# Patient Record
Sex: FEMALE | Race: WHITE | NOT HISPANIC OR LATINO | URBAN - METROPOLITAN AREA
[De-identification: names, ages, dates, MRNs, and addresses within clinical notes are randomized per-mention and may not be internally consistent; named-entity substitution may affect disease eponyms.]

---

## 2018-01-08 ENCOUNTER — OFFICE VISIT (OUTPATIENT)
Dept: MEDICAL GROUP | Facility: MEDICAL CENTER | Age: 78
End: 2018-01-08
Payer: MEDICARE

## 2018-01-08 VITALS
HEART RATE: 74 BPM | TEMPERATURE: 98.2 F | DIASTOLIC BLOOD PRESSURE: 74 MMHG | WEIGHT: 140.2 LBS | RESPIRATION RATE: 16 BRPM | SYSTOLIC BLOOD PRESSURE: 118 MMHG | HEIGHT: 62 IN | OXYGEN SATURATION: 92 % | BODY MASS INDEX: 25.8 KG/M2

## 2018-01-08 DIAGNOSIS — Z72.0 TOBACCO USE: ICD-10-CM

## 2018-01-08 DIAGNOSIS — E03.9 ACQUIRED HYPOTHYROIDISM: ICD-10-CM

## 2018-01-08 DIAGNOSIS — J45.21 MILD INTERMITTENT ASTHMA WITH EXACERBATION: ICD-10-CM

## 2018-01-08 DIAGNOSIS — Z12.31 ENCOUNTER FOR SCREENING MAMMOGRAM FOR MALIGNANT NEOPLASM OF BREAST: ICD-10-CM

## 2018-01-08 PROCEDURE — 99204 OFFICE O/P NEW MOD 45 MIN: CPT | Performed by: INTERNAL MEDICINE

## 2018-01-08 RX ORDER — FEXOFENADINE HCL 180 MG/1
180 TABLET ORAL DAILY
Qty: 90 TAB | Refills: 3 | Status: SHIPPED | OUTPATIENT
Start: 2018-01-08

## 2018-01-08 RX ORDER — LORATADINE 10 MG/1
10 TABLET ORAL DAILY
COMMUNITY

## 2018-01-08 RX ORDER — TRIAMCINOLONE ACETONIDE 40 MG/ML
40 INJECTION, SUSPENSION INTRA-ARTICULAR; INTRAMUSCULAR ONCE
Status: COMPLETED | OUTPATIENT
Start: 2018-01-08 | End: 2018-01-08

## 2018-01-08 RX ORDER — MONTELUKAST SODIUM 10 MG/1
10 TABLET ORAL DAILY
Qty: 90 TAB | Refills: 0 | Status: SHIPPED | OUTPATIENT
Start: 2018-01-08

## 2018-01-08 RX ORDER — FLUTICASONE PROPIONATE 50 MCG
1 SPRAY, SUSPENSION (ML) NASAL DAILY
COMMUNITY

## 2018-01-08 RX ORDER — AZITHROMYCIN 250 MG/1
TABLET, FILM COATED ORAL
Qty: 6 TAB | Refills: 0 | Status: SHIPPED | OUTPATIENT
Start: 2018-01-08 | End: 2018-01-15

## 2018-01-08 RX ORDER — LEVOTHYROXINE SODIUM 0.07 MG/1
75 TABLET ORAL
COMMUNITY
End: 2018-01-15 | Stop reason: SDUPTHER

## 2018-01-08 RX ORDER — PREDNISONE 10 MG/1
TABLET ORAL
Qty: 15 TAB | Refills: 0 | Status: CANCELLED | OUTPATIENT
Start: 2018-01-08 | End: 2018-01-14

## 2018-01-08 RX ADMIN — TRIAMCINOLONE ACETONIDE 40 MG: 40 INJECTION, SUSPENSION INTRA-ARTICULAR; INTRAMUSCULAR at 18:02

## 2018-01-08 ASSESSMENT — PATIENT HEALTH QUESTIONNAIRE - PHQ9: CLINICAL INTERPRETATION OF PHQ2 SCORE: 0

## 2018-01-09 NOTE — PROGRESS NOTES
CHIEF COMPLAINT  Chief Complaint   Patient presents with   • Establish Care     thyroid issues, chronic back pain and allergies     HPI  Patient is a 77 y.o. female patient who presents today for the following     HYPOTHYROIDISM  Levothyroxine, 75 mcg, taking daily early in am, before any po intake.  No temperature intolerance. No change in weight,  hair/skin quality, BMs.   No tremors, weakness.  No peripheral swelling.  No mood changes.  FH: N    Allergies   Onset: since childhood  Season/timing:  throughout the year   - worsening  Sx: Nasal congestion/sneezing, wheezing.   Medications: Claritin and Flonase daily   - Combivent daily  FH: N    Asthma  Since the age of 71 yrs  Worse for the last few months.  Combivent use: daily   - few times daily in the past week  FH: N    Tobacco use  Amount: 2 cig/day    Reviewed PMH, PSH, FH, SH, ALL, HCM/IMM, no changes  Reviewed MEDS, no changes    Patient Active Problem List    Diagnosis Date Noted   • Acquired hypothyroidism 01/08/2018   • Encounter for screening mammogram for malignant neoplasm of breast 01/08/2018     CURRENT MEDICATIONS  Current Outpatient Prescriptions   Medication Sig Dispense Refill   • levothyroxine (SYNTHROID) 75 MCG Tab Take 75 mcg by mouth Every morning on an empty stomach.     • loratadine (CLARITIN) 10 MG Tab Take 10 mg by mouth every day.     • fluticasone (FLONASE) 50 MCG/ACT nasal spray Spray 1 Spray in nose every day.     • albuterol-ipratropium (COMBIVENT)  MCG/ACT Aerosol Inhale 2 Puffs by mouth 4 times a day.       No current facility-administered medications for this visit.      ALLERGIES  Allergies: Penicillins  PAST MEDICAL HISTORY  Past Medical History:   Diagnosis Date   • Asthma    • Environmental allergies    • Hypothyroidism    • Osteopenia    • Sciatica of right side      SURGICAL HISTORY  She  has a past surgical history that includes primary c section and hysterectomy radical.  SOCIAL HISTORY  Social History   Substance  "Use Topics   • Smoking status: Heavy Tobacco Smoker     Packs/day: 2.00     Types: Cigarettes   • Smokeless tobacco: Never Used   • Alcohol use Yes      Comment: occasionally     Social History     Social History Narrative   • No narrative on file     FAMILY HISTORY  Family History   Problem Relation Age of Onset   • Psychiatry Mother      Dementia   • Cancer Father      Smoker   • Thyroid Neg Hx    • Allergies Neg Hx      Family Status   Relation Status   • Mother    • Father    • Neg Hx      ROS   Constitutional: Negative for fever, chills.  HENT: Negative for sore throat. And per HPI.  Eyes: Negative for blurred vision.   Respiratory: Negative for cough, shortness of breath.  Cardiovascular: Negative for chest pain, palpitations.   Gastrointestinal: Negative for heartburn, nausea, abdominal pain.   Genitourinary: Negative for dysuria.  Musculoskeletal: Negative for significant myalgias, back pain and joint pain.   Skin: Negative for rash and itching.   Neuro: Negative for dizziness, weakness and headaches.   Endo/Heme/Allergies: Does not bruise/bleed easily. And per HPI.  Psychiatric/Behavioral: Negative for depression, anxiety    PHYSICAL EXAM   Blood pressure 118/74, pulse 74, temperature 36.8 °C (98.2 °F), resp. rate 16, height 1.575 m (5' 2\"), weight 63.6 kg (140 lb 3.2 oz), SpO2 92 %. Body mass index is 25.64 kg/m².  General:  NAD, well appearing  HEENT:   NC/AT, PERRLA, EOMI, TMs are clear. Oropharyngeal mucosa is pink,  without lesions;  no cervical / supraclavicular  lymphadenopathy, no thyromegaly.    Cardiovascular: RRR.   No m/r/g. No carotid bruits .      Lungs:   Decreased air entry with bilateral wheezing, no r/r, no respiratory distress.  Abdomen: Soft, NT/ND + BS; no suprapubic tenderness; no masses or hepatosplenomegaly.  Extremities:  2+ DP and radial pulses bilaterally.  No c/c/e.   Skin:  Warm, dry.  No erythema. No rash.   Neurologic: Alert & oriented x 3.No focal " deficits.  Psychiatric:  Affect normal, mood normal, judgment normal.    LABS     Pending    IMAGING     None    ASSESMENT AND PLAN        1. Mild intermittent asthma with exacerbation  - azithromycin (ZITHROMAX) 250 MG Tab; Take 1 tabl twice daily the first day, then 1 tabl daily, PO.  Dispense: 6 Tab; Refill: 0    Advised to take abx after a meal.   Side effects of abx use discussed with a patient. Advised to stop abx and call if develop any side effect, including diarrhea.     - fexofenadine (ALLEGRA) 180 MG tablet; Take 1 Tab by mouth every day.  Dispense: 90 Tab; Refill: 3  - montelukast (SINGULAIR) 10 MG Tab; Take 1 Tab by mouth every day.  Dispense: 90 Tab; Refill: 0  - triamcinolone acetonide (KENALOG-40) injection 40 mg; 1 mL by Intramuscular route Once.    Increase fluid intake  Continue Combivent 3-4 times daily for the next 5 days    2. Acquired hypothyroidism  Asymptomatic.  Continue current dose of levothyroxine, pending TSH  - TSH; Future    3. Encounter for screening mammogram for malignant neoplasm of breast  - MA-SCREEN MAMMO W/CAD-BILAT; Future    4. Tobacco use  Advised to quit smoking    Followup: Return in about 4 weeks (around 2/5/2018) for Short.    All questions are answered.    Please note that this dictation was created using voice recognition software, and that there might be errors of court and possibly content.

## 2018-01-10 ENCOUNTER — TELEPHONE (OUTPATIENT)
Dept: MEDICAL GROUP | Facility: MEDICAL CENTER | Age: 78
End: 2018-01-10

## 2018-01-10 NOTE — TELEPHONE ENCOUNTER
ESTABLISHED PATIENT PRE-VISIT PLANNING     Note: Patient will not be contacted if there is no indication to call.     1.  Reviewed notes from the last few office visits within the medical group: Yes    2.  If any orders were placed at last visit or intended to be done for this visit (i.e. 6 mos follow-up), do we have Results/Consult Notes?        •  Labs - Labs ordered, NOT completed. Patient advised to complete prior to next appointment.   Note: If patient appointment is for lab review and patient did not complete labs, check with provider if OK to reschedule patient until labs completed.       •  Imaging - Imaging ordered, NOT completed. Patient advised to complete prior to next appointment.       •  Referrals - No referrals were ordered at last office visit.    3. Is this appointment scheduled as a Hospital Follow-Up? No    4.  Immunizations were updated in Epic using WebIZ?: No WebIZ record       •  Web Iz Recommendations:     5.  Patient is due for the following Health Maintenance Topics:   Health Maintenance Due   Topic Date Due   • IMM DTaP/Tdap/Td Vaccine (1 - Tdap) 05/08/1960   • MAMMOGRAM  05/08/1981   • IMM ZOSTER VACCINE  SCHEDULED  05/08/2001   • BONE DENSITY  05/08/2006   • IMM PNEUMOCOCCAL 65+ (ADULT) LOW/MEDIUM RISK SERIES (1 of 2 - PCV13) 05/08/2006   • IMM INFLUENZA (1) 09/01/2017           6.  Patient was informed to arrive 15 min prior to their scheduled appointment and bring in their medication bottles.      Left msg for patient.

## 2018-01-11 ENCOUNTER — APPOINTMENT (OUTPATIENT)
Dept: MEDICAL GROUP | Facility: MEDICAL CENTER | Age: 78
End: 2018-01-11
Payer: MEDICARE

## 2018-01-15 ENCOUNTER — OFFICE VISIT (OUTPATIENT)
Dept: MEDICAL GROUP | Facility: MEDICAL CENTER | Age: 78
End: 2018-01-15
Payer: MEDICARE

## 2018-01-15 VITALS
DIASTOLIC BLOOD PRESSURE: 72 MMHG | HEART RATE: 100 BPM | RESPIRATION RATE: 20 BRPM | BODY MASS INDEX: 24.67 KG/M2 | WEIGHT: 134.04 LBS | TEMPERATURE: 98.7 F | SYSTOLIC BLOOD PRESSURE: 140 MMHG | OXYGEN SATURATION: 93 % | HEIGHT: 62 IN

## 2018-01-15 DIAGNOSIS — E03.9 ACQUIRED HYPOTHYROIDISM: ICD-10-CM

## 2018-01-15 DIAGNOSIS — Z72.0 TOBACCO USE: ICD-10-CM

## 2018-01-15 DIAGNOSIS — Z00.00 HEALTH CARE MAINTENANCE: ICD-10-CM

## 2018-01-15 DIAGNOSIS — J45.21 MILD INTERMITTENT ASTHMA WITH EXACERBATION: ICD-10-CM

## 2018-01-15 PROCEDURE — 99214 OFFICE O/P EST MOD 30 MIN: CPT | Performed by: INTERNAL MEDICINE

## 2018-01-15 RX ORDER — LEVOTHYROXINE SODIUM 0.07 MG/1
75 TABLET ORAL
Qty: 90 TAB | Refills: 0 | Status: SHIPPED | OUTPATIENT
Start: 2018-01-15

## 2018-01-16 NOTE — PROGRESS NOTES
CHIEF COMPLAINT  Chief Complaint   Patient presents with   • Follow-Up     Asthma     HPI  Patient is a 77 y.o. female patient who presents today for the following     Asthma  Interval course:   - started singular at last office visit  - Improved, got worse yesterday with wheezing, with the weather change    Background:  Since the age of 71 yrs  Worse for the last few months.  Combivent use: daily                        - few times daily in the past week  FH: N    Allergies   Onset: since childhood  Season/timing:  throughout the year                        - worsening  Sx: Nasal congestion/sneezing, wheezing.   Medications: Claritin and Flonase daily                        - Combivent daily  FH: N     Tobacco use  Amount: 2 cig/day, no change.    HYPOTHYROIDISM  Levothyroxine, 75 mcg, taking daily early in am, before any po intake.  No temperature intolerance. No change in weight,  hair/skin quality, BMs.   No tremors, weakness.  No peripheral swelling.  No mood changes.  FH: N  TSH: pending    Reviewed PMH, PSH, FH, SH, ALL, HCM/IMM, no changes  Reviewed MEDS, no changes    Patient Active Problem List    Diagnosis Date Noted   • Acquired hypothyroidism 01/08/2018   • Encounter for screening mammogram for malignant neoplasm of breast 01/08/2018   • Tobacco use 01/08/2018   • Mild intermittent asthma with exacerbation 01/08/2018     CURRENT MEDICATIONS  Current Outpatient Prescriptions   Medication Sig Dispense Refill   • levothyroxine (SYNTHROID) 75 MCG Tab Take 1 Tab by mouth Every morning on an empty stomach. 90 Tab 0   • albuterol-ipratropium (COMBIVENT)  MCG/ACT Aerosol Inhale 2 Puffs by mouth 4 times a day.     • fexofenadine (ALLEGRA) 180 MG tablet Take 1 Tab by mouth every day. 90 Tab 3   • montelukast (SINGULAIR) 10 MG Tab Take 1 Tab by mouth every day. 90 Tab 0   • loratadine (CLARITIN) 10 MG Tab Take 10 mg by mouth every day.     • fluticasone (FLONASE) 50 MCG/ACT nasal spray Spray 1 Spray in nose  "every day.       No current facility-administered medications for this visit.      ALLERGIES  Allergies: Penicillins  PAST MEDICAL HISTORY  Past Medical History:   Diagnosis Date   • Asthma    • Environmental allergies    • Hypothyroidism    • Osteopenia    • Sciatica of right side      SURGICAL HISTORY  She  has a past surgical history that includes primary c section and hysterectomy radical.  SOCIAL HISTORY  Social History   Substance Use Topics   • Smoking status: Current Every Day Smoker     Packs/day: 0.20     Types: Cigarettes   • Smokeless tobacco: Never Used   • Alcohol use Yes      Comment: occasionally     Social History     Social History Narrative   • No narrative on file     FAMILY HISTORY  Family History   Problem Relation Age of Onset   • Psychiatry Mother      Dementia   • Cancer Father      Smoker   • Thyroid Neg Hx    • Allergies Neg Hx      Family Status   Relation Status   • Mother    • Father    • Neg Hx        ROS   Constitutional: Negative for fever, chills.  HENT: Negative for congestion, sore throat.  Eyes: Negative for blurred vision.   Respiratory: as above.  Cardiovascular: Negative for chest pain, palpitations.   Gastrointestinal: Negative for heartburn, nausea, abdominal pain.   Genitourinary: Negative for dysuria.  Musculoskeletal: Negative for significant myalgias, back pain and joint pain.   Skin: Negative for rash and itching.   Neuro: Negative for dizziness, weakness and headaches.   Endo/Heme/Allergies: Does not bruise/bleed easily. And per HPI.  Psychiatric/Behavioral: Negative for depression, anxiety    PHYSICAL EXAM   Blood pressure 140/72, pulse 100, temperature 37.1 °C (98.7 °F), resp. rate 20, height 1.575 m (5' 2\"), weight 60.8 kg (134 lb 0.6 oz), SpO2 93 %. Body mass index is 24.52 kg/m².  General:  NAD, well appearing  HEENT:   NC/AT, PERRLA, EOMI, TMs are clear. Oropharyngeal mucosa is pink,  without lesions;  no cervical / supraclavicular  " lymphadenopathy, no thyromegaly.    Cardiovascular: RRR.   No m/r/g. No carotid bruits .      Lungs:   Mild B/L expiratory wheezing, no r/r, no respiratory distress.  Abdomen: Soft, NT/ND + BS; no suprapubic tenderness; no masses or hepatosplenomegaly.  Extremities:  2+ DP and radial pulses bilaterally.  No c/c/e.   Skin:  Warm, dry.  No erythema. No rash.   Neurologic: Alert & oriented x 3. No focal deficits.  Psychiatric:  Affect normal, mood normal, judgment normal.    LABS     Pending.    IMAGING     None    ASSESMENT AND PLAN        1. Mild intermittent asthma with exacerbation  Continue current treatment, follow-up in 4-6 weeks.   Anticipate to start inhaled steroid if no response to Singulair.    2. Acquired hypothyroidism  Pending TSH, continue current dose of levothyroxine  - levothyroxine (SYNTHROID) 75 MCG Tab; Take 1 Tab by mouth Every morning on an empty stomach.  Dispense: 90 Tab; Refill: 0    3. Tobacco use  Advised to quit smoking    4. Health care maintenance  Pending records.   Pending mammogram within one week.    Counseling:   - Smoking:  Nonsmoker    Followup: Return in about 4 weeks (around 2/12/2018) for Short.    All questions are answered.    Please note that this dictation was created using voice recognition software, and that there might be errors of court and possibly content.

## 2018-01-18 ENCOUNTER — HOSPITAL ENCOUNTER (OUTPATIENT)
Dept: LAB | Facility: MEDICAL CENTER | Age: 78
End: 2018-01-18
Attending: INTERNAL MEDICINE
Payer: MEDICARE

## 2018-01-18 DIAGNOSIS — E03.9 ACQUIRED HYPOTHYROIDISM: ICD-10-CM

## 2018-01-18 LAB — TSH SERPL DL<=0.005 MIU/L-ACNC: 2.07 UIU/ML (ref 0.38–5.33)

## 2018-01-18 PROCEDURE — 36415 COLL VENOUS BLD VENIPUNCTURE: CPT

## 2018-01-18 PROCEDURE — 84443 ASSAY THYROID STIM HORMONE: CPT

## 2018-01-19 ENCOUNTER — APPOINTMENT (OUTPATIENT)
Dept: RADIOLOGY | Facility: MEDICAL CENTER | Age: 78
End: 2018-01-19
Attending: INTERNAL MEDICINE
Payer: MEDICARE

## 2018-01-22 ENCOUNTER — TELEPHONE (OUTPATIENT)
Dept: MEDICAL GROUP | Facility: MEDICAL CENTER | Age: 78
End: 2018-01-22

## 2018-02-08 ENCOUNTER — TELEPHONE (OUTPATIENT)
Dept: MEDICAL GROUP | Facility: MEDICAL CENTER | Age: 78
End: 2018-02-08

## 2018-02-08 NOTE — TELEPHONE ENCOUNTER
ESTABLISHED PATIENT PRE-VISIT PLANNING     Note: Patient will not be contacted if there is no indication to call.     1.  Reviewed notes from the last few office visits within the medical group: Yes 01/22/2018    2.  If any orders were placed at last visit or intended to be done for this visit (i.e. 6 mos follow-up), do we have Results/Consult Notes?        •  Labs - Labs ordered, completed on 01/18/2018 and results are in chart.   Note: If patient appointment is for lab review and patient did not complete labs, check with provider if OK to reschedule patient until labs completed.       •  Imaging MAMMO ORDERED      3. Is this appointment scheduled as a Hospital Follow-Up? No    4.  Immunizations were updated in Epic using WebIZ?: No WebIZ record      5.  Patient is due for the following Health Maintenance Topics:   Health Maintenance Due   Topic Date Due   • IMM DTaP/Tdap/Td Vaccine (1 - Tdap) 05/08/1960   • MAMMOGRAM  05/08/1981   • IMM ZOSTER VACCINE  05/08/2001   • BONE DENSITY  05/08/2006   • IMM PNEUMOCOCCAL 65+ (ADULT) LOW/MEDIUM RISK SERIES (1 of 2 - PCV13) 05/08/2006   • IMM INFLUENZA (1) 09/01/2017         6.  Patient was NOT informed to arrive 15 min prior to their scheduled appointment and bring in their medication bottles.

## 2018-02-12 ENCOUNTER — APPOINTMENT (OUTPATIENT)
Dept: MEDICAL GROUP | Facility: MEDICAL CENTER | Age: 78
End: 2018-02-12
Payer: MEDICARE

## 2018-03-05 ENCOUNTER — OFFICE VISIT (OUTPATIENT)
Dept: MEDICAL GROUP | Facility: MEDICAL CENTER | Age: 78
End: 2018-03-05
Payer: MEDICARE

## 2018-03-05 VITALS
HEIGHT: 62 IN | TEMPERATURE: 97.2 F | WEIGHT: 139.11 LBS | BODY MASS INDEX: 25.6 KG/M2 | OXYGEN SATURATION: 94 % | SYSTOLIC BLOOD PRESSURE: 142 MMHG | HEART RATE: 74 BPM | DIASTOLIC BLOOD PRESSURE: 82 MMHG

## 2018-03-05 DIAGNOSIS — Z00.00 HEALTH CARE MAINTENANCE: ICD-10-CM

## 2018-03-05 DIAGNOSIS — J45.21 MILD INTERMITTENT ASTHMA WITH EXACERBATION: ICD-10-CM

## 2018-03-05 DIAGNOSIS — J30.1 CHRONIC SEASONAL ALLERGIC RHINITIS DUE TO POLLEN: ICD-10-CM

## 2018-03-05 DIAGNOSIS — Z72.0 TOBACCO USE: ICD-10-CM

## 2018-03-05 PROCEDURE — 99214 OFFICE O/P EST MOD 30 MIN: CPT | Performed by: INTERNAL MEDICINE

## 2018-03-05 RX ORDER — FLUTICASONE PROPIONATE 220 UG/1
1 AEROSOL, METERED RESPIRATORY (INHALATION) 2 TIMES DAILY
Qty: 1 INHALER | Refills: 2 | Status: SHIPPED | OUTPATIENT
Start: 2018-03-05

## 2018-03-05 RX ORDER — ALBUTEROL SULFATE 90 UG/1
2 AEROSOL, METERED RESPIRATORY (INHALATION) EVERY 6 HOURS PRN
Qty: 8.5 G | Refills: 3 | Status: SHIPPED | OUTPATIENT
Start: 2018-03-05

## 2018-03-06 NOTE — PROGRESS NOTES
CHIEF COMPLAINT  Chief Complaint   Patient presents with   • Asthma     Asthma 2 mon FV     HPI  Patient is a 77 y.o. female patient who presents today for the following       Asthma  Interval course:   - started singular at last office visit  - Continued stool have almost daily wheezing and cough  - Was compliant with medications  - Has not used Combivent daily     Background:  Since the age of 71 yrs  Worse for the last few months.  Combivent use: daily                        - few times daily in the past week  FH: N     Allergies   Onset: since childhood  Season/timing:  throughout the year                        - worsening  Sx: Nasal congestion/sneezing, wheezing.   Medications: Claritin and Flonase daily  She has not have allergy evaluation in the past.  FH: N    Tobacco use  Amount: 2 cig/day, no change.    Reviewed PMH, PSH, FH, SH, ALL, HCM/IMM, no changes  Reviewed MEDS, no changes    Patient Active Problem List    Diagnosis Date Noted   • Acquired hypothyroidism 01/08/2018   • Encounter for screening mammogram for malignant neoplasm of breast 01/08/2018   • Tobacco use 01/08/2018   • Mild intermittent asthma with exacerbation 01/08/2018     CURRENT MEDICATIONS  Current Outpatient Prescriptions   Medication Sig Dispense Refill   • fluticasone (FLOVENT HFA) 220 MCG/ACT Aerosol Inhale 1 Puff by mouth 2 times a day. 1 Inhaler 2   • albuterol 108 (90 Base) MCG/ACT Aero Soln inhalation aerosol Inhale 2 Puffs by mouth every 6 hours as needed for Shortness of Breath. 8.5 g 3   • levothyroxine (SYNTHROID) 75 MCG Tab Take 1 Tab by mouth Every morning on an empty stomach. 90 Tab 0   • albuterol-ipratropium (COMBIVENT)  MCG/ACT Aerosol Inhale 2 Puffs by mouth 4 times a day.     • fexofenadine (ALLEGRA) 180 MG tablet Take 1 Tab by mouth every day. 90 Tab 3   • montelukast (SINGULAIR) 10 MG Tab Take 1 Tab by mouth every day. 90 Tab 0   • loratadine (CLARITIN) 10 MG Tab Take 10 mg by mouth every day.     •  "fluticasone (FLONASE) 50 MCG/ACT nasal spray Spray 1 Spray in nose every day.       No current facility-administered medications for this visit.      ALLERGIES  Allergies: Penicillins  PAST MEDICAL HISTORY  Past Medical History:   Diagnosis Date   • Asthma    • Environmental allergies    • Hypothyroidism    • Osteopenia    • Sciatica of right side      SURGICAL HISTORY  She  has a past surgical history that includes primary c section and hysterectomy radical.  SOCIAL HISTORY  Social History   Substance Use Topics   • Smoking status: Current Every Day Smoker     Packs/day: 0.20     Types: Cigarettes   • Smokeless tobacco: Never Used   • Alcohol use Yes      Comment: occasionally     Social History     Social History Narrative   • No narrative on file     FAMILY HISTORY  Family History   Problem Relation Age of Onset   • Psychiatry Mother      Dementia   • Cancer Father      Smoker   • Thyroid Neg Hx    • Allergies Neg Hx      Family Status   Relation Status   • Mother    • Father    • Neg Hx      ROS   Constitutional: Negative for fever, chills.  HENT: Negative for congestion, sore throat.  Eyes: Negative for blurred vision.   Respiratory:  As above  Cardiovascular: Negative for chest pain, palpitations.   Gastrointestinal: Negative for heartburn, nausea, abdominal pain.   Musculoskeletal: Negative for significant myalgias, back pain and joint pain.   Skin: Negative for rash and itching.   Neuro: Negative for dizziness, weakness and headaches.   Endo/Heme/Allergies: Does not bruise/bleed easily.   Psychiatric/Behavioral: Negative for depression, anxiety    PHYSICAL EXAM   Blood pressure 142/82, pulse 74, temperature 36.2 °C (97.2 °F), height 1.575 m (5' 2\"), weight 63.1 kg (139 lb 1.8 oz), SpO2 94 %. Body mass index is 25.44 kg/m².  General:  NAD, well appearing  HEENT:   NC/AT, PERRLA, EOMI, TMs are clear. Oropharyngeal mucosa is pink,  without lesions;  no cervical / supraclavicular  " lymphadenopathy, no thyromegaly.    Cardiovascular: RRR.   No m/r/g. No carotid bruits .      Lungs:   Bilateral wheezing, good air entry, no r/r, no respiratory distress.  Abdomen: Soft, NT/ND + BS; no suprapubic tenderness; no masses or hepatosplenomegaly.  Extremities:  2+ DP and radial pulses bilaterally.  No c/c/e.   Skin:  Warm, dry.  No erythema. No rash.   Neurologic: Alert & oriented x 3. No focal deficits.  Psychiatric:  Affect normal, mood normal, judgment normal.    LABS     None    IMAGING     None    ASSESMENT AND PLAN        1. Mild intermittent asthma with exacerbation  Uncontrolled, add Flovent and albuterol instead of Combivent:    - fluticasone (FLOVENT HFA) 220 MCG/ACT Aerosol; Inhale 1 Puff by mouth 2 times a day.  Dispense: 1 Inhaler; Refill: 2  - PULMONARY FUNCTION TESTS Test requested: Spirometry with-out & with Bronchodilator  - albuterol 108 (90 Base) MCG/ACT Aero Soln inhalation aerosol; Inhale 2 Puffs by mouth every 6 hours as needed for Shortness of Breath.  Dispense: 8.5 g; Refill: 3    2. Chronic seasonal allergic rhinitis due to pollen  - REFERRAL TO ALLERGY    3. Tobacco use  Advised to quit smoking    4. Health care maintenance  Advised to do mammogram, already given orders.   Advised immunizations per HCM    Counseling:   - Smoking:  Nonsmoker    Followup: Return in about 6 weeks (around 4/16/2018) for Short.    All questions are answered.    Please note that this dictation was created using voice recognition software, and that there might be errors of court and possibly content.

## 2018-03-07 ENCOUNTER — TELEPHONE (OUTPATIENT)
Dept: MEDICAL GROUP | Facility: MEDICAL CENTER | Age: 78
End: 2018-03-07

## 2018-03-07 DIAGNOSIS — J45.21 MILD INTERMITTENT ASTHMA WITH EXACERBATION: ICD-10-CM

## 2018-03-07 RX ORDER — LEVALBUTEROL TARTRATE 45 UG/1
1-2 AEROSOL, METERED ORAL EVERY 6 HOURS PRN
Qty: 1 INHALER | Refills: 3 | Status: SHIPPED | OUTPATIENT
Start: 2018-03-07

## 2018-03-07 NOTE — TELEPHONE ENCOUNTER
Dr. Wright,     Pt called and stated that the albuterol inhaler is causing her more difficulty breathing and causing her not to sleep at night. Should she continue or stop medication? Please advise,     Thank you.    Anjali Mcmillan  Medical Assistant

## 2018-06-21 ENCOUNTER — APPOINTMENT (RX ONLY)
Dept: URBAN - METROPOLITAN AREA CLINIC 31 | Facility: CLINIC | Age: 78
Setting detail: DERMATOLOGY
End: 2018-06-21

## 2018-06-21 DIAGNOSIS — D49.2 NEOPLASM OF UNSPECIFIED BEHAVIOR OF BONE, SOFT TISSUE, AND SKIN: ICD-10-CM

## 2018-06-21 DIAGNOSIS — L259 CONTACT DERMATITIS AND OTHER ECZEMA, UNSPECIFIED CAUSE: ICD-10-CM

## 2018-06-21 PROBLEM — E03.9 HYPOTHYROIDISM, UNSPECIFIED: Status: ACTIVE | Noted: 2018-06-21

## 2018-06-21 PROBLEM — L23.9 ALLERGIC CONTACT DERMATITIS, UNSPECIFIED CAUSE: Status: ACTIVE | Noted: 2018-06-21

## 2018-06-21 PROBLEM — J45.909 UNSPECIFIED ASTHMA, UNCOMPLICATED: Status: ACTIVE | Noted: 2018-06-21

## 2018-06-21 PROCEDURE — ? MEDICATION COUNSELING

## 2018-06-21 PROCEDURE — ? BIOPSY BY SHAVE METHOD

## 2018-06-21 PROCEDURE — ? ADDITIONAL NOTES

## 2018-06-21 PROCEDURE — ? PRESCRIPTION

## 2018-06-21 PROCEDURE — 11100: CPT

## 2018-06-21 PROCEDURE — 99202 OFFICE O/P NEW SF 15 MIN: CPT | Mod: 25

## 2018-06-21 PROCEDURE — ? COUNSELING

## 2018-06-21 RX ORDER — BETAMETHASONE DIPROPIONATE 0.5 MG/G
OINTMENT TOPICAL
Qty: 1 | Refills: 0 | Status: ERX | COMMUNITY
Start: 2018-06-21

## 2018-06-21 RX ADMIN — BETAMETHASONE DIPROPIONATE: 0.5 OINTMENT TOPICAL at 20:26

## 2018-06-21 ASSESSMENT — LOCATION SIMPLE DESCRIPTION DERM
LOCATION SIMPLE: LEFT FOREARM
LOCATION SIMPLE: POSTERIOR NECK
LOCATION SIMPLE: CHEST
LOCATION SIMPLE: CHIN
LOCATION SIMPLE: RIGHT FOREARM

## 2018-06-21 ASSESSMENT — LOCATION ZONE DERM
LOCATION ZONE: TRUNK
LOCATION ZONE: NECK
LOCATION ZONE: ARM
LOCATION ZONE: FACE

## 2018-06-21 ASSESSMENT — LOCATION DETAILED DESCRIPTION DERM
LOCATION DETAILED: MID POSTERIOR NECK
LOCATION DETAILED: LEFT CHIN
LOCATION DETAILED: RIGHT PROXIMAL DORSAL FOREARM
LOCATION DETAILED: LEFT PROXIMAL DORSAL FOREARM
LOCATION DETAILED: UPPER STERNUM

## 2018-06-21 NOTE — PROCEDURE: ADDITIONAL NOTES
Additional Notes: Will do a biopsy today. Will send Rx for Betamethasone Ointment today. Discussed treatment and risks in detail with patient. Recommend gentle cleansers and moisturizer daily, like Cetaphil or CeraVe. Moisturize within 3 minutes of showering.\\nAdvised patient to try to stay out of the sun or wear sun protection.\\nRash is in exposed areas so consider PMLE also; On only thyroid medication and no oral supplements.

## 2018-06-21 NOTE — HPI: RASH
How Severe Is Your Rash?: mild
Is This A New Presentation, Or A Follow-Up?: Rash
Additional History: Patient states the rash started on her chest then spread to her arms. Came up first as red bumps, then blistered. No blisters now.\\nPatient has no history of rashes or Eczema. Patient does have a history of Asthma and seasonal allergies. Patient’s brother has a history of Eczema.

## 2018-06-21 NOTE — PROCEDURE: MEDICATION COUNSELING
Elidel Pregnancy And Lactation Text: This medication is Pregnancy Category C. It is unknown if this medication is excreted in breast milk.
Cephalexin Pregnancy And Lactation Text: This medication is Pregnancy Category B and considered safe during pregnancy.  It is also excreted in breast milk but can be used safely for shorter doses.
Minoxidil Pregnancy And Lactation Text: This medication has not been assigned a Pregnancy Risk Category but animal studies failed to show danger with the topical medication. It is unknown if the medication is excreted in breast milk.
Benzoyl Peroxide Counseling: Patient counseled that medicine may cause skin irritation and bleach clothing.  In the event of skin irritation, the patient was advised to reduce the amount of the drug applied or use it less frequently.   The patient verbalized understanding of the proper use and possible adverse effects of benzoyl peroxide.  All of the patient's questions and concerns were addressed.
Quinolones Counseling:  I discussed with the patient the risks of fluoroquinolones including but not limited to GI upset, allergic reaction, drug rash, diarrhea, dizziness, photosensitivity, yeast infections, liver function test abnormalities, tendonitis/tendon rupture.
Quinolones Pregnancy And Lactation Text: This medication is Pregnancy Category C and it isn't know if it is safe during pregnancy. It is also excreted in breast milk.
Ketoconazole Counseling:   Patient counseled regarding improving absorption with orange juice.  Adverse effects include but are not limited to breast enlargement, headache, diarrhea, nausea, upset stomach, liver function test abnormalities, taste disturbance, and stomach pain.  There is a rare possibility of liver failure that can occur when taking ketoconazole. The patient understands that monitoring of LFTs may be required, especially at baseline. The patient verbalized understanding of the proper use and possible adverse effects of ketoconazole.  All of the patient's questions and concerns were addressed.
Xolair Pregnancy And Lactation Text: This medication is Pregnancy Category B and is considered safe during pregnancy. This medication is excreted in breast milk.
Griseofulvin Counseling:  I discussed with the patient the risks of griseofulvin including but not limited to photosensitivity, cytopenia, liver damage, nausea/vomiting and severe allergy.  The patient understands that this medication is best absorbed when taken with a fatty meal (e.g., ice cream or french fries).
Dupixent Pregnancy And Lactation Text: This medication likely crosses the placenta but the risk for the fetus is uncertain. This medication is excreted in breast milk.
Doxepin Pregnancy And Lactation Text: This medication is Pregnancy Category C and it isn't known if it is safe during pregnancy. It is also excreted in breast milk and breast feeding isn't recommended.
Thalidomide Counseling: I discussed with the patient the risks of thalidomide including but not limited to birth defects, anxiety, weakness, chest pain, dizziness, cough and severe allergy.
Rituxan Counseling:  I discussed with the patient the risks of Rituxan infusions. Side effects can include infusion reactions, severe drug rashes including mucocutaneous reactions, reactivation of latent hepatitis and other infections and rarely progressive multifocal leukoencephalopathy.  All of the patient's questions and concerns were addressed.
SSKI Counseling:  I discussed with the patient the risks of SSKI including but not limited to thyroid abnormalities, metallic taste, GI upset, fever, headache, acne, arthralgias, paraesthesias, lymphadenopathy, easy bleeding, arrhythmias, and allergic reaction.
Stelara Counseling:  I discussed with the patient the risks of ustekinumab including but not limited to immunosuppression, malignancy, posterior leukoencephalopathy syndrome, and serious infections.  The patient understands that monitoring is required including a PPD at baseline and must alert us or the primary physician if symptoms of infection or other concerning signs are noted.
Tazorac Counseling:  Patient advised that medication is irritating and drying.  Patient may need to apply sparingly and wash off after an hour before eventually leaving it on overnight.  The patient verbalized understanding of the proper use and possible adverse effects of tazorac.  All of the patient's questions and concerns were addressed.
Oxybutynin Counseling:  I discussed with the patient the risks of oxybutynin including but not limited to skin rash, drowsiness, dry mouth, difficulty urinating, and blurred vision.
Griseofulvin Pregnancy And Lactation Text: This medication is Pregnancy Category X and is known to cause serious birth defects. It is unknown if this medication is excreted in breast milk but breast feeding should be avoided.
Dapsone Counseling: I discussed with the patient the risks of dapsone including but not limited to hemolytic anemia, agranulocytosis, rashes, methemoglobinemia, kidney failure, peripheral neuropathy, headaches, GI upset, and liver toxicity.  Patients who start dapsone require monitoring including baseline LFTs and weekly CBCs for the first month, then every month thereafter.  The patient verbalized understanding of the proper use and possible adverse effects of dapsone.  All of the patient's questions and concerns were addressed.
Spironolactone Counseling: Patient advised regarding risks of diarrhea, abdominal pain, hyperkalemia, birth defects (for female patients), liver toxicity and renal toxicity. The patient may need blood work to monitor liver and kidney function and potassium levels while on therapy. The patient verbalized understanding of the proper use and possible adverse effects of spironolactone.  All of the patient's questions and concerns were addressed.
Albendazole Pregnancy And Lactation Text: This medication is Pregnancy Category C and it isn't known if it is safe during pregnancy. It is also excreted in breast milk.
Birth Control Pills Pregnancy And Lactation Text: This medication should be avoided if pregnant and for the first 30 days post-partum.
Erivedge Counseling- I discussed with the patient the risks of Erivedge including but not limited to nausea, vomiting, diarrhea, constipation, weight loss, changes in the sense of taste, decreased appetite, muscle spasms, and hair loss.  The patient verbalized understanding of the proper use and possible adverse effects of Erivedge.  All of the patient's questions and concerns were addressed.
Zyclara Counseling:  I discussed with the patient the risks of imiquimod including but not limited to erythema, scaling, itching, weeping, crusting, and pain.  Patient understands that the inflammatory response to imiquimod is variable from person to person and was educated regarded proper titration schedule.  If flu-like symptoms develop, patient knows to discontinue the medication and contact us.
Colchicine Counseling:  Patient counseled regarding adverse effects including but not limited to stomach upset (nausea, vomiting, stomach pain, or diarrhea).  Patient instructed to limit alcohol consumption while taking this medication.  Colchicine may reduce blood counts especially with prolonged use.  The patient understands that monitoring of kidney function and blood counts may be required, especially at baseline. The patient verbalized understanding of the proper use and possible adverse effects of colchicine.  All of the patient's questions and concerns were addressed.
Odomzo Pregnancy And Lactation Text: This medication is Pregnancy Category X and is absolutely contraindicated during pregnancy. It is unknown if it is excreted in breast milk.
Nsaids Pregnancy And Lactation Text: These medications are considered safe up to 30 weeks gestation. It is excreted in breast milk.
Arava Counseling:  Patient counseled regarding adverse effects of Arava including but not limited to nausea, vomiting, abnormalities in liver function tests. Patients may develop mouth sores, rash, diarrhea, and abnormalities in blood counts. The patient understands that monitoring is required including LFTs and blood counts.  There is a rare possibility of scarring of the liver and lung problems that can occur when taking methotrexate. Persistent nausea, loss of appetite, pale stools, dark urine, cough, and shortness of breath should be reported immediately. Patient advised to discontinue Arava treatment and consult with a physician prior to attempting conception. The patient will have to undergo a treatment to eliminate Arava from the body prior to conception.
Enbrel Pregnancy And Lactation Text: This medication is Pregnancy Category B and is considered safe during pregnancy. It is unknown if this medication is excreted in breast milk.
Taltz Counseling: I discussed with the patient the risks of ixekizumab including but not limited to immunosuppression, serious infections, worsening of inflammatory bowel disease and drug reactions.  The patient understands that monitoring is required including a PPD at baseline and must alert us or the primary physician if symptoms of infection or other concerning signs are noted.
Hydroxyzine Pregnancy And Lactation Text: This medication is not safe during pregnancy and should not be taken. It is also excreted in breast milk and breast feeding isn't recommended.
Topical Clindamycin Pregnancy And Lactation Text: This medication is Pregnancy Category B and is considered safe during pregnancy. It is unknown if it is excreted in breast milk.
Cosentyx Counseling:  I discussed with the patient the risks of Cosentyx including but not limited to worsening of Crohn's disease, immunosuppression, allergic reactions and infections.  The patient understands that monitoring is required including a PPD at baseline and must alert us or the primary physician if symptoms of infection or other concerning signs are noted.
Albendazole Counseling:  I discussed with the patient the risks of albendazole including but not limited to cytopenia, kidney damage, nausea/vomiting and severe allergy.  The patient understands that this medication is being used in an off-label manner.
Tremfya Pregnancy And Lactation Text: The risk during pregnancy and breastfeeding is uncertain with this medication.
Methotrexate Pregnancy And Lactation Text: This medication is Pregnancy Category X and is known to cause fetal harm. This medication is excreted in breast milk.
Gabapentin Counseling: I discussed with the patient the risks of gabapentin including but not limited to dizziness, somnolence, fatigue and ataxia.
Dupixent Counseling: I discussed with the patient the risks of dupilumab including but not limited to eye infection and irritation, cold sores, injection site reactions, worsening of asthma, allergic reactions and increased risk of parasitic infection.  Live vaccines should be avoided while taking dupilumab. Dupilumab will also interact with certain medications such as warfarin and cyclosporine. The patient understands that monitoring is required and they must alert us or the primary physician if symptoms of infection or other concerning signs are noted.
Azithromycin Counseling:  I discussed with the patient the risks of azithromycin including but not limited to GI upset, allergic reaction, drug rash, diarrhea, and yeast infections.
Isotretinoin Pregnancy And Lactation Text: This medication is Pregnancy Category X and is considered extremely dangerous during pregnancy. It is unknown if it is excreted in breast milk.
Methotrexate Counseling:  Patient counseled regarding adverse effects of methotrexate including but not limited to nausea, vomiting, abnormalities in liver function tests. Patients may develop mouth sores, rash, diarrhea, and abnormalities in blood counts. The patient understands that monitoring is required including LFT's and blood counts.  There is a rare possibility of scarring of the liver and lung problems that can occur when taking methotrexate. Persistent nausea, loss of appetite, pale stools, dark urine, cough, and shortness of breath should be reported immediately. Patient advised to discontinue methotrexate treatment at least three months before attempting to become pregnant.  I discussed the need for folate supplements while taking methotrexate.  These supplements can decrease side effects during methotrexate treatment. The patient verbalized understanding of the proper use and possible adverse effects of methotrexate.  All of the patient's questions and concerns were addressed.
Clofazimine Pregnancy And Lactation Text: This medication is Pregnancy Category C and isn't considered safe during pregnancy. It is excreted in breast milk.
Xolair Counseling:  Patient informed of potential adverse effects including but not limited to fever, muscle aches, rash and allergic reactions.  The patient verbalized understanding of the proper use and possible adverse effects of Xolair.  All of the patient's questions and concerns were addressed.
Otezla Pregnancy And Lactation Text: This medication is Pregnancy Category C and it isn't known if it is safe during pregnancy. It is unknown if it is excreted in breast milk.
Detail Level: Zone
Prednisone Pregnancy And Lactation Text: This medication is Pregnancy Category C and it isn't know if it is safe during pregnancy. This medication is excreted in breast milk.
5-Fu Counseling: 5-Fluorouracil Counseling:  I discussed with the patient the risks of 5-fluorouracil including but not limited to erythema, scaling, itching, weeping, crusting, and pain.
Ketoconazole Pregnancy And Lactation Text: This medication is Pregnancy Category C and it isn't know if it is safe during pregnancy. It is also excreted in breast milk and breast feeding isn't recommended.
Topical Sulfur Applications Pregnancy And Lactation Text: This medication is Pregnancy Category C and has an unknown safety profile during pregnancy. It is unknown if this topical medication is excreted in breast milk.
Azathioprine Counseling:  I discussed with the patient the risks of azathioprine including but not limited to myelosuppression, immunosuppression, hepatotoxicity, lymphoma, and infections.  The patient understands that monitoring is required including baseline LFTs, Creatinine, possible TPMP genotyping and weekly CBCs for the first month and then every 2 weeks thereafter.  The patient verbalized understanding of the proper use and possible adverse effects of azathioprine.  All of the patient's questions and concerns were addressed.
Cimetidine Pregnancy And Lactation Text: This medication is Pregnancy Category B and is considered safe during pregnancy. It is also excreted in breast milk and breast feeding isn't recommended.
Solaraze Counseling:  I discussed with the patient the risks of Solaraze including but not limited to erythema, scaling, itching, weeping, crusting, and pain.
Bactrim Pregnancy And Lactation Text: This medication is Pregnancy Category D and is known to cause fetal risk.  It is also excreted in breast milk.
Spironolactone Pregnancy And Lactation Text: This medication can cause feminization of the male fetus and should be avoided during pregnancy. The active metabolite is also found in breast milk.
Hydroxychloroquine Counseling:  I discussed with the patient that a baseline ophthalmologic exam is needed at the start of therapy and every year thereafter while on therapy. A CBC may also be warranted for monitoring.  The side effects of this medication were discussed with the patient, including but not limited to agranulocytosis, aplastic anemia, seizures, rashes, retinopathy, and liver toxicity. Patient instructed to call the office should any adverse effect occur.  The patient verbalized understanding of the proper use and possible adverse effects of Plaquenil.  All the patient's questions and concerns were addressed.
Acitretin Pregnancy And Lactation Text: This medication is Pregnancy Category X and should not be given to women who are pregnant or may become pregnant in the future. This medication is excreted in breast milk.
Erythromycin Pregnancy And Lactation Text: This medication is Pregnancy Category B and is considered safe during pregnancy. It is also excreted in breast milk.
Bexarotene Counseling:  I discussed with the patient the risks of bexarotene including but not limited to hair loss, dry lips/skin/eyes, liver abnormalities, hyperlipidemia, pancreatitis, depression/suicidal ideation, photosensitivity, drug rash/allergic reactions, hypothyroidism, anemia, leukopenia, infection, cataracts, and teratogenicity.  Patient understands that they will need regular blood tests to check lipid profile, liver function tests, white blood cell count, thyroid function tests and pregnancy test if applicable.
Cyclosporine Counseling:  I discussed with the patient the risks of cyclosporine including but not limited to hypertension, gingival hyperplasia,myelosuppression, immunosuppression, liver damage, kidney damage, neurotoxicity, lymphoma, and serious infections. The patient understands that monitoring is required including baseline blood pressure, CBC, CMP, lipid panel and uric acid, and then 1-2 times monthly CMP and blood pressure.
Include Pregnancy/Lactation Warning?: No
Xeljanz Counseling: I discussed with the patient the risks of Xeljanz therapy including increased risk of infection, liver issues, headache, diarrhea, or cold symptoms. Live vaccines should be avoided. They were instructed to call if they have any problems.
Fluconazole Counseling:  Patient counseled regarding adverse effects of fluconazole including but not limited to headache, diarrhea, nausea, upset stomach, liver function test abnormalities, taste disturbance, and stomach pain.  There is a rare possibility of liver failure that can occur when taking fluconazole.  The patient understands that monitoring of LFTs and kidney function test may be required, especially at baseline. The patient verbalized understanding of the proper use and possible adverse effects of fluconazole.  All of the patient's questions and concerns were addressed.
Drysol Counseling:  I discussed with the patient the risks of drysol/aluminum chloride including but not limited to skin rash, itching, irritation, burning.
Glycopyrrolate Counseling:  I discussed with the patient the risks of glycopyrrolate including but not limited to skin rash, drowsiness, dry mouth, difficulty urinating, and blurred vision.
Cellcept Pregnancy And Lactation Text: This medication is Pregnancy Category D and isn't considered safe during pregnancy. It is unknown if this medication is excreted in breast milk.
Tremfya Counseling: I discussed with the patient the risks of guselkumab including but not limited to immunosuppression, serious infections, worsening of inflammatory bowel disease and drug reactions.  The patient understands that monitoring is required including a PPD at baseline and must alert us or the primary physician if symptoms of infection or other concerning signs are noted.
Tetracycline Counseling: Patient counseled regarding possible photosensitivity and increased risk for sunburn.  Patient instructed to avoid sunlight, if possible.  When exposed to sunlight, patients should wear protective clothing, sunglasses, and sunscreen.  The patient was instructed to call the office immediately if the following severe adverse effects occur:  hearing changes, easy bruising/bleeding, severe headache, or vision changes.  The patient verbalized understanding of the proper use and possible adverse effects of tetracycline.  All of the patient's questions and concerns were addressed. Patient understands to avoid pregnancy while on therapy due to potential birth defects.
Valtrex Counseling: I discussed with the patient the risks of valacyclovir including but not limited to kidney damage, nausea, vomiting and severe allergy.  The patient understands that if the infection seems to be worsening or is not improving, they are to call.
Terbinafine Counseling: Patient counseling regarding adverse effects of terbinafine including but not limited to headache, diarrhea, rash, upset stomach, liver function test abnormalities, itching, taste/smell disturbance, nausea, abdominal pain, and flatulence.  There is a rare possibility of liver failure that can occur when taking terbinafine.  The patient understands that a baseline LFT and kidney function test may be required. The patient verbalized understanding of the proper use and possible adverse effects of terbinafine.  All of the patient's questions and concerns were addressed.
Doxepin Counseling:  Patient advised that the medication is sedating and not to drive a car after taking this medication. Patient informed of potential adverse effects including but not limited to dry mouth, urinary retention, and blurry vision.  The patient verbalized understanding of the proper use and possible adverse effects of doxepin.  All of the patient's questions and concerns were addressed.
Elidel Counseling: Patient may experience a mild burning sensation during topical application. Elidel is not approved in children less than 2 years of age. There have been case reports of hematologic and skin malignancies in patients using topical calcineurin inhibitors although causality is questionable.
Rituxan Pregnancy And Lactation Text: This medication is Pregnancy Category C and it isn't know if it is safe during pregnancy. It is unknown if this medication is excreted in breast milk but similar antibodies are known to be excreted.
Carac Pregnancy And Lactation Text: This medication is Pregnancy Category X and contraindicated in pregnancy and in women who may become pregnant. It is unknown if this medication is excreted in breast milk.
Itraconazole Counseling:  I discussed with the patient the risks of itraconazole including but not limited to liver damage, nausea/vomiting, neuropathy, and severe allergy.  The patient understands that this medication is best absorbed when taken with acidic beverages such as non-diet cola or ginger ale.  The patient understands that monitoring is required including baseline LFTs and repeat LFTs at intervals.  The patient understands that they are to contact us or the primary physician if concerning signs are noted.
Metronidazole Counseling:  I discussed with the patient the risks of metronidazole including but not limited to seizures, nausea/vomiting, a metallic taste in the mouth, nausea/vomiting and severe allergy.
Eucrisa Counseling: Patient may experience a mild burning sensation during topical application. Eucrisa is not approved in children less than 2 years of age.
Xellewisz Pregnancy And Lactation Text: This medication is Pregnancy Category D and is not considered safe during pregnancy.  The risk during breast feeding is also uncertain.
High Dose Vitamin A Pregnancy And Lactation Text: High dose vitamin A therapy is contraindicated during pregnancy and breast feeding.
Solaraze Pregnancy And Lactation Text: This medication is Pregnancy Category B and is considered safe. There is some data to suggest avoiding during the third trimester. It is unknown if this medication is excreted in breast milk.
Ivermectin Counseling:  Patient instructed to take medication on an empty stomach with a full glass of water.  Patient informed of potential adverse effects including but not limited to nausea, diarrhea, dizziness, itching, and swelling of the extremities or lymph nodes.  The patient verbalized understanding of the proper use and possible adverse effects of ivermectin.  All of the patient's questions and concerns were addressed.
Minocycline Pregnancy And Lactation Text: This medication is Pregnancy Category D and not consider safe during pregnancy. It is also excreted in breast milk.
Minocycline Counseling: Patient advised regarding possible photosensitivity and discoloration of the teeth, skin, lips, tongue and gums.  Patient instructed to avoid sunlight, if possible.  When exposed to sunlight, patients should wear protective clothing, sunglasses, and sunscreen.  The patient was instructed to call the office immediately if the following severe adverse effects occur:  hearing changes, easy bruising/bleeding, severe headache, or vision changes.  The patient verbalized understanding of the proper use and possible adverse effects of minocycline.  All of the patient's questions and concerns were addressed.
Enbrel Counseling:  I discussed with the patient the risks of etanercept including but not limited to myelosuppression, immunosuppression, autoimmune hepatitis, demyelinating diseases, lymphoma, and infections.  The patient understands that monitoring is required including a PPD at baseline and must alert us or the primary physician if symptoms of infection or other concerning signs are noted.
Doxycycline Pregnancy And Lactation Text: This medication is Pregnancy Category D and not consider safe during pregnancy. It is also excreted in breast milk but is considered safe for shorter treatment courses.
Hydroquinone Counseling:  Patient advised that medication may result in skin irritation, lightening (hypopigmentation), dryness, and burning.  In the event of skin irritation, the patient was advised to reduce the amount of the drug applied or use it less frequently.  Rarely, spots that are treated with hydroquinone can become darker (pseudoochronosis).  Should this occur, patient instructed to stop medication and call the office. The patient verbalized understanding of the proper use and possible adverse effects of hydroquinone.  All of the patient's questions and concerns were addressed.
Protopic Pregnancy And Lactation Text: This medication is Pregnancy Category C. It is unknown if this medication is excreted in breast milk when applied topically.
Humira Counseling:  I discussed with the patient the risks of adalimumab including but not limited to myelosuppression, immunosuppression, autoimmune hepatitis, demyelinating diseases, lymphoma, and serious infections.  The patient understands that monitoring is required including a PPD at baseline and must alert us or the primary physician if symptoms of infection or other concerning signs are noted.
Topical Clindamycin Counseling: Patient counseled that this medication may cause skin irritation or allergic reactions.  In the event of skin irritation, the patient was advised to reduce the amount of the drug applied or use it less frequently.   The patient verbalized understanding of the proper use and possible adverse effects of clindamycin.  All of the patient's questions and concerns were addressed.
Tazorac Pregnancy And Lactation Text: This medication is not safe during pregnancy. It is unknown if this medication is excreted in breast milk.
Odomzo Counseling- I discussed with the patient the risks of Odomzo including but not limited to nausea, vomiting, diarrhea, constipation, weight loss, changes in the sense of taste, decreased appetite, muscle spasms, and hair loss.  The patient verbalized understanding of the proper use and possible adverse effects of Odomzo.  All of the patient's questions and concerns were addressed.
Prednisone Counseling:  I discussed with the patient the risks of prolonged use of prednisone including but not limited to weight gain, insomnia, osteoporosis, mood changes, diabetes, susceptibility to infection, glaucoma and high blood pressure.  In cases where prednisone use is prolonged, patients should be monitored with blood pressure checks, serum glucose levels and an eye exam.  Additionally, the patient may need to be placed on GI prophylaxis, PCP prophylaxis, and calcium and vitamin D supplementation and/or a bisphosphonate.  The patient verbalized understanding of the proper use and the possible adverse effects of prednisone.  All of the patient's questions and concerns were addressed.
Azithromycin Pregnancy And Lactation Text: This medication is considered safe during pregnancy and is also secreted in breast milk.
Picato Counseling:  I discussed with the patient the risks of Picato including but not limited to erythema, scaling, itching, weeping, crusting, and pain.
Bexarotene Pregnancy And Lactation Text: This medication is Pregnancy Category X and should not be given to women who are pregnant or may become pregnant. This medication should not be used if you are breast feeding.
Rifampin Counseling: I discussed with the patient the risks of rifampin including but not limited to liver damage, kidney damage, red-orange body fluids, nausea/vomiting and severe allergy.
Benzoyl Peroxide Pregnancy And Lactation Text: This medication is Pregnancy Category C. It is unknown if benzoyl peroxide is excreted in breast milk.
Erythromycin Counseling:  I discussed with the patient the risks of erythromycin including but not limited to GI upset, allergic reaction, drug rash, diarrhea, increase in liver enzymes, and yeast infections.
Nsaids Counseling: NSAID Counseling: I discussed with the patient that NSAIDs should be taken with food. Prolonged use of NSAIDs can result in the development of stomach ulcers.  Patient advised to stop taking NSAIDs if abdominal pain occurs.  The patient verbalized understanding of the proper use and possible adverse effects of NSAIDs.  All of the patient's questions and concerns were addressed.
Clofazimine Counseling:  I discussed with the patient the risks of clofazimine including but not limited to skin and eye pigmentation, liver damage, nausea/vomiting, gastrointestinal bleeding and allergy.
Birth Control Pills Counseling: Birth Control Pill Counseling: I discussed with the patient the potential side effects of OCPs including but not limited to increased risk of stroke, heart attack, thrombophlebitis, deep venous thrombosis, hepatic adenomas, breast changes, GI upset, headaches, and depression.  The patient verbalized understanding of the proper use and possible adverse effects of OCPs. All of the patient's questions and concerns were addressed.
Cephalexin Counseling: I counseled the patient regarding use of cephalexin as an antibiotic for prophylactic and/or therapeutic purposes. Cephalexin (commonly prescribed under brand name Keflex) is a cephalosporin antibiotic which is active against numerous classes of bacteria, including most skin bacteria. Side effects may include nausea, diarrhea, gastrointestinal upset, rash, hives, yeast infections, and in rare cases, hepatitis, kidney disease, seizures, fever, confusion, neurologic symptoms, and others. Patients with severe allergies to penicillin medications are cautioned that there is about a 10% incidence of cross-reactivity with cephalosporins. When possible, patients with penicillin allergies should use alternatives to cephalosporins for antibiotic therapy.
Metronidazole Pregnancy And Lactation Text: This medication is Pregnancy Category B and considered safe during pregnancy.  It is also excreted in breast milk.
Rifampin Pregnancy And Lactation Text: This medication is Pregnancy Category C and it isn't know if it is safe during pregnancy. It is also excreted in breast milk and should not be used if you are breast feeding.
Topical Retinoid counseling:  Patient advised to apply a pea-sized amount only at bedtime and wait 30 minutes after washing their face before applying.  If too drying, patient may add a non-comedogenic moisturizer. The patient verbalized understanding of the proper use and possible adverse effects of retinoids.  All of the patient's questions and concerns were addressed.
Dapsone Pregnancy And Lactation Text: This medication is Pregnancy Category C and is not considered safe during pregnancy or breast feeding.
Carac Counseling:  I discussed with the patient the risks of Carac including but not limited to erythema, scaling, itching, weeping, crusting, and pain.
Topical Sulfur Applications Counseling: Topical Sulfur Counseling: Patient counseled that this medication may cause skin irritation or allergic reactions.  In the event of skin irritation, the patient was advised to reduce the amount of the drug applied or use it less frequently.   The patient verbalized understanding of the proper use and possible adverse effects of topical sulfur application.  All of the patient's questions and concerns were addressed.
Acitretin Counseling:  I discussed with the patient the risks of acitretin including but not limited to hair loss, dry lips/skin/eyes, liver damage, hyperlipidemia, depression/suicidal ideation, photosensitivity.  Serious rare side effects can include but are not limited to pancreatitis, pseudotumor cerebri, bony changes, clot formation/stroke/heart attack.  Patient understands that alcohol is contraindicated since it can result in liver toxicity and significantly prolong the elimination of the drug by many years.
Hydroxychloroquine Pregnancy And Lactation Text: This medication has been shown to cause fetal harm but it isn't assigned a Pregnancy Risk Category. There are small amounts excreted in breast milk.
Glycopyrrolate Pregnancy And Lactation Text: This medication is Pregnancy Category B and is considered safe during pregnancy. It is unknown if it is excreted breast milk.
Protopic Counseling: Patient may experience a mild burning sensation during topical application. Protopic is not approved in children less than 2 years of age. There have been case reports of hematologic and skin malignancies in patients using topical calcineurin inhibitors although causality is questionable.
Doxycycline Counseling:  Patient counseled regarding possible photosensitivity and increased risk for sunburn.  Patient instructed to avoid sunlight, if possible.  When exposed to sunlight, patients should wear protective clothing, sunglasses, and sunscreen.  The patient was instructed to call the office immediately if the following severe adverse effects occur:  hearing changes, easy bruising/bleeding, severe headache, or vision changes.  The patient verbalized understanding of the proper use and possible adverse effects of doxycycline.  All of the patient's questions and concerns were addressed.
Cimetidine Counseling:  I discussed with the patient the risks of Cimetidine including but not limited to gynecomastia, headache, diarrhea, nausea, drowsiness, arrhythmias, pancreatitis, skin rashes, psychosis, bone marrow suppression and kidney toxicity.
High Dose Vitamin A Counseling: Side effects reviewed, pt to contact office should one occur.
Bactrim Counseling:  I discussed with the patient the risks of sulfa antibiotics including but not limited to GI upset, allergic reaction, drug rash, diarrhea, dizziness, photosensitivity, and yeast infections.  Rarely, more serious reactions can occur including but not limited to aplastic anemia, agranulocytosis, methemoglobinemia, blood dyscrasias, liver or kidney failure, lung infiltrates or desquamative/blistering drug rashes.
Infliximab Counseling:  I discussed with the patient the risks of infliximab including but not limited to myelosuppression, immunosuppression, autoimmune hepatitis, demyelinating diseases, lymphoma, and serious infections.  The patient understands that monitoring is required including a PPD at baseline and must alert us or the primary physician if symptoms of infection or other concerning signs are noted.
Drysol Pregnancy And Lactation Text: This medication is considered safe during pregnancy and breast feeding.
Simponi Counseling:  I discussed with the patient the risks of golimumab including but not limited to myelosuppression, immunosuppression, autoimmune hepatitis, demyelinating diseases, lymphoma, and serious infections.  The patient understands that monitoring is required including a PPD at baseline and must alert us or the primary physician if symptoms of infection or other concerning signs are noted.
Cellcept Counseling:  I discussed with the patient the risks of mycophenolate mofetil including but not limited to infection/immunosuppression, GI upset, hypokalemia, hypercholesterolemia, bone marrow suppression, lymphoproliferative disorders, malignancy, GI ulceration/bleed/perforation, colitis, interstitial lung disease, kidney failure, progressive multifocal leukoencephalopathy, and birth defects.  The patient understands that monitoring is required including a baseline creatinine and regular CBC testing. In addition, patient must alert us immediately if symptoms of infection or other concerning signs are noted.
Isotretinoin Counseling: Patient should get monthly blood tests, not donate blood, not drive at night if vision affected, not share medication, and not undergo elective surgery for 6 months after tx completed. Side effects reviewed, pt to contact office should one occur.
Valtrex Pregnancy And Lactation Text: this medication is Pregnancy Category B and is considered safe during pregnancy. This medication is not directly found in breast milk but it's metabolite acyclovir is present.
Hydroxyzine Counseling: Patient advised that the medication is sedating and not to drive a car after taking this medication.  Patient informed of potential adverse effects including but not limited to dry mouth, urinary retention, and blurry vision.  The patient verbalized understanding of the proper use and possible adverse effects of hydroxyzine.  All of the patient's questions and concerns were addressed.
Minoxidil Counseling: Minoxidil is a topical medication which can increase blood flow where it is applied. It is uncertain how this medication increases hair growth. Side effects are uncommon and include stinging and allergic reactions.
Imiquimod Counseling:  I discussed with the patient the risks of imiquimod including but not limited to erythema, scaling, itching, weeping, crusting, and pain.  Patient understands that the inflammatory response to imiquimod is variable from person to person and was educated regarded proper titration schedule.  If flu-like symptoms develop, patient knows to discontinue the medication and contact us.
Sski Pregnancy And Lactation Text: This medication is Pregnancy Category D and isn't considered safe during pregnancy. It is excreted in breast milk.
Otezla Counseling: The side effects of Otezla were discussed with the patient, including but not limited to worsening or new depression, weight loss, diarrhea, nausea, upper respiratory tract infection, and headache. Patient instructed to call the office should any adverse effect occur.  The patient verbalized understanding of the proper use and possible adverse effects of Otezla.  All the patient's questions and concerns were addressed.

## 2018-06-21 NOTE — PROCEDURE: BIOPSY BY SHAVE METHOD
Electrodesiccation Text: The wound bed was treated with electrodesiccation after the biopsy was performed.
Type Of Destruction Used: Curettage
Depth Of Biopsy: dermis
Silver Nitrate Text: The wound bed was treated with silver nitrate after the biopsy was performed.
Post-Care Instructions: I reviewed with the patient in detail post-care instructions. Patient is to keep the biopsy site dry overnight, and then apply bacitracin twice daily until healed. Patient may apply hydrogen peroxide soaks to remove any crusting.
Detail Level: Detailed
Billing Type: Third-Party Bill
Biopsy Method: 15 blade
Dressing: Band-Aid
Wound Care: Aquaphor
Was A Bandage Applied: Yes
Additional Anesthesia Volume In Cc (Will Not Render If 0): 0
Bill For Surgical Tray: no
Curettage Text: The wound bed was treated with curettage after the biopsy was performed.
Lab: [id number]
Consent: Written consent was obtained and risks were reviewed including but not limited to scarring, infection, bleeding, scabbing, incomplete removal, nerve damage and allergy to anesthesia.
Biopsy Type: H and E
Anesthesia Type: 1% lidocaine with epinephrine
Electrodesiccation And Curettage Text: The wound bed was treated with electrodesiccation and curettage after the biopsy was performed.
Lab Facility: 
Cryotherapy Text: The wound bed was treated with cryotherapy after the biopsy was performed.
Notification Instructions: Patient will be notified of biopsy results. However, patient instructed to call the office if not contacted within 2 weeks.
Hemostasis: Aluminum Chloride and Electrocautery

## 2021-01-11 DIAGNOSIS — Z23 NEED FOR VACCINATION: ICD-10-CM
